# Patient Record
Sex: FEMALE | HISPANIC OR LATINO | Employment: UNEMPLOYED | URBAN - NONMETROPOLITAN AREA
[De-identification: names, ages, dates, MRNs, and addresses within clinical notes are randomized per-mention and may not be internally consistent; named-entity substitution may affect disease eponyms.]

---
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
## 2024-01-15 ENCOUNTER — OFFICE VISIT (OUTPATIENT)
Dept: OBSTETRICS AND GYNECOLOGY | Facility: CLINIC | Age: 19
End: 2024-01-15
Payer: COMMERCIAL

## 2024-01-15 VITALS
BODY MASS INDEX: 23.74 KG/M2 | HEIGHT: 62 IN | SYSTOLIC BLOOD PRESSURE: 112 MMHG | WEIGHT: 129 LBS | DIASTOLIC BLOOD PRESSURE: 62 MMHG

## 2024-01-15 DIAGNOSIS — Z30.016 ENCOUNTER FOR INITIAL PRESCRIPTION OF TRANSDERMAL PATCH HORMONAL CONTRACEPTIVE DEVICE: Primary | ICD-10-CM

## 2024-01-15 PROCEDURE — 1159F MED LIST DOCD IN RCRD: CPT | Performed by: PHYSICIAN ASSISTANT

## 2024-01-15 PROCEDURE — 1160F RVW MEDS BY RX/DR IN RCRD: CPT | Performed by: PHYSICIAN ASSISTANT

## 2024-01-15 PROCEDURE — 99202 OFFICE O/P NEW SF 15 MIN: CPT | Performed by: PHYSICIAN ASSISTANT

## 2024-01-15 RX ORDER — NORELGESTROMIN AND ETHINYL ESTRADIOL 150; 35 UG/D; UG/D
1 PATCH TRANSDERMAL WEEKLY
Qty: 3 PATCH | Refills: 12 | Status: SHIPPED | OUTPATIENT
Start: 2024-01-15 | End: 2025-01-14

## 2024-01-15 NOTE — PATIENT INSTRUCTIONS
Start patch first day of next menses  Patch not effective first month  Condoms always  RTO 4 months for annual exam/pap

## 2024-01-15 NOTE — PROGRESS NOTES
"Subjective   Chief Complaint   Patient presents with    Contraception     IUD Consult       Aileen Still is a 18 y.o. year old No obstetric history on file. presenting to be seen for contraception.  She is considering contraceptive patch or possibly IUD  Has taken ocp about 1 year ago and could not be consistent remembering ocp daily  Has regular monthly cycles and LMP Dec 18-22  Had EAB summer 2023  She declines STI screening  After discussing all contraceptive options she would like to try contraceptive patch    Past Medical History:   Diagnosis Date    Asthma     Chlamydia         Current Outpatient Medications:     norelgestromin-ethinyl estradiol (Xulane) 150-35 MCG/24HR, Place 1 patch on the skin as directed by provider 1 (One) Time Per Week., Disp: 3 patch, Rfl: 12   No Known Allergies   History reviewed. No pertinent surgical history.   Social History     Socioeconomic History    Marital status: Single   Tobacco Use    Smoking status: Never    Smokeless tobacco: Never   Vaping Use    Vaping Use: Never used   Substance and Sexual Activity    Alcohol use: Never    Drug use: Never    Sexual activity: Yes     Partners: Male     Birth control/protection: Abstinence      History reviewed. No pertinent family history.    Review of Systems   Constitutional:  Negative for chills, diaphoresis and fever.   Gastrointestinal:  Negative for constipation, diarrhea, nausea and vomiting.   Genitourinary:  Negative for difficulty urinating, dysuria, menstrual problem, pelvic pain and vaginal discharge.           Objective   /62   Ht 157.5 cm (62\")   Wt 58.5 kg (129 lb)   BMI 23.59 kg/m²     Physical Exam  Constitutional:       Appearance: Normal appearance. She is well-developed and well-groomed.   Eyes:      General: Lids are normal.      Extraocular Movements: Extraocular movements intact.      Conjunctiva/sclera: Conjunctivae normal.   Skin:     General: Skin is warm and dry.      Findings: No bruising or " lesion.   Neurological:      General: No focal deficit present.      Mental Status: She is alert and oriented to person, place, and time.   Psychiatric:         Attention and Perception: Attention normal.         Mood and Affect: Mood normal.         Speech: Speech normal.         Behavior: Behavior is cooperative.            Result Review :                   Assessment and Plan  Diagnoses and all orders for this visit:    1. Encounter for initial prescription of transdermal patch hormonal contraceptive device (Primary)    Other orders  -     norelgestromin-ethinyl estradiol (Xulane) 150-35 MCG/24HR; Place 1 patch on the skin as directed by provider 1 (One) Time Per Week.  Dispense: 3 patch; Refill: 12      Patient Instructions   Start patch first day of next menses  Patch not effective first month  Condoms always  RTO 4 months for annual exam/pap            This note was electronically signed.    Joselin Alfonso PA-C   January 15, 2024

## 2024-08-09 PROBLEM — J02.9 SORE THROAT: Status: ACTIVE | Noted: 2024-08-09

## 2024-08-11 ENCOUNTER — APPOINTMENT (OUTPATIENT)
Dept: CT IMAGING | Facility: HOSPITAL | Age: 19
End: 2024-08-11
Payer: COMMERCIAL

## 2024-08-11 ENCOUNTER — HOSPITAL ENCOUNTER (EMERGENCY)
Facility: HOSPITAL | Age: 19
Discharge: HOME | End: 2024-08-11
Attending: STUDENT IN AN ORGANIZED HEALTH CARE EDUCATION/TRAINING PROGRAM
Payer: COMMERCIAL

## 2024-08-11 VITALS
TEMPERATURE: 97.9 F | HEIGHT: 64 IN | BODY MASS INDEX: 22.02 KG/M2 | WEIGHT: 129 LBS | DIASTOLIC BLOOD PRESSURE: 65 MMHG | HEART RATE: 75 BPM | SYSTOLIC BLOOD PRESSURE: 122 MMHG | OXYGEN SATURATION: 97 % | RESPIRATION RATE: 18 BRPM

## 2024-08-11 DIAGNOSIS — J40 BRONCHITIS: Primary | ICD-10-CM

## 2024-08-11 LAB
ALBUMIN SERPL-MCNC: 4.1 G/DL (ref 3.5–5.2)
ALBUMIN/GLOB SERPL: 1.2 G/DL
ALP SERPL-CCNC: 60 U/L (ref 39–117)
ALT SERPL W P-5'-P-CCNC: 15 U/L (ref 1–33)
ANION GAP SERPL CALCULATED.3IONS-SCNC: 10.7 MMOL/L (ref 5–15)
AST SERPL-CCNC: 21 U/L (ref 1–32)
BASOPHILS # BLD AUTO: 0.03 10*3/MM3 (ref 0–0.2)
BASOPHILS NFR BLD AUTO: 0.5 % (ref 0–1.5)
BILIRUB SERPL-MCNC: 0.2 MG/DL (ref 0–1.2)
BUN SERPL-MCNC: 7 MG/DL (ref 6–20)
BUN/CREAT SERPL: 11.9 (ref 7–25)
CALCIUM SPEC-SCNC: 8.6 MG/DL (ref 8.6–10.5)
CHLORIDE SERPL-SCNC: 105 MMOL/L (ref 98–107)
CO2 SERPL-SCNC: 21.3 MMOL/L (ref 22–29)
CREAT SERPL-MCNC: 0.59 MG/DL (ref 0.57–1)
DEPRECATED RDW RBC AUTO: 45.1 FL (ref 37–54)
EGFRCR SERPLBLD CKD-EPI 2021: 131.7 ML/MIN/1.73
EOSINOPHIL # BLD AUTO: 0.12 10*3/MM3 (ref 0–0.4)
EOSINOPHIL NFR BLD AUTO: 2 % (ref 0.3–6.2)
ERYTHROCYTE [DISTWIDTH] IN BLOOD BY AUTOMATED COUNT: 15.6 % (ref 12.3–15.4)
GLOBULIN UR ELPH-MCNC: 3.4 GM/DL
GLUCOSE SERPL-MCNC: 101 MG/DL (ref 65–99)
HCT VFR BLD AUTO: 39.8 % (ref 34–46.6)
HGB BLD-MCNC: 13 G/DL (ref 12–15.9)
IMM GRANULOCYTES # BLD AUTO: 0.01 10*3/MM3 (ref 0–0.05)
IMM GRANULOCYTES NFR BLD AUTO: 0.2 % (ref 0–0.5)
LYMPHOCYTES # BLD AUTO: 2.62 10*3/MM3 (ref 0.7–3.1)
LYMPHOCYTES NFR BLD AUTO: 44.3 % (ref 19.6–45.3)
MCH RBC QN AUTO: 26.1 PG (ref 26.6–33)
MCHC RBC AUTO-ENTMCNC: 32.7 G/DL (ref 31.5–35.7)
MCV RBC AUTO: 79.9 FL (ref 79–97)
MONOCYTES # BLD AUTO: 0.35 10*3/MM3 (ref 0.1–0.9)
MONOCYTES NFR BLD AUTO: 5.9 % (ref 5–12)
NEUTROPHILS NFR BLD AUTO: 2.78 10*3/MM3 (ref 1.7–7)
NEUTROPHILS NFR BLD AUTO: 47.1 % (ref 42.7–76)
NRBC BLD AUTO-RTO: 0 /100 WBC (ref 0–0.2)
NT-PROBNP SERPL-MCNC: <36 PG/ML (ref 0–450)
PLATELET # BLD AUTO: 316 10*3/MM3 (ref 140–450)
PMV BLD AUTO: 9.6 FL (ref 6–12)
POTASSIUM SERPL-SCNC: 4.1 MMOL/L (ref 3.5–5.2)
PROT SERPL-MCNC: 7.5 G/DL (ref 6–8.5)
RBC # BLD AUTO: 4.98 10*6/MM3 (ref 3.77–5.28)
SODIUM SERPL-SCNC: 137 MMOL/L (ref 136–145)
TROPONIN T SERPL HS-MCNC: <6 NG/L
WBC NRBC COR # BLD AUTO: 5.91 10*3/MM3 (ref 3.4–10.8)

## 2024-08-11 PROCEDURE — 93005 ELECTROCARDIOGRAM TRACING: CPT | Performed by: STUDENT IN AN ORGANIZED HEALTH CARE EDUCATION/TRAINING PROGRAM

## 2024-08-11 PROCEDURE — 84484 ASSAY OF TROPONIN QUANT: CPT | Performed by: STUDENT IN AN ORGANIZED HEALTH CARE EDUCATION/TRAINING PROGRAM

## 2024-08-11 PROCEDURE — 25510000001 IOPAMIDOL 61 % SOLUTION: Performed by: STUDENT IN AN ORGANIZED HEALTH CARE EDUCATION/TRAINING PROGRAM

## 2024-08-11 PROCEDURE — 71275 CT ANGIOGRAPHY CHEST: CPT

## 2024-08-11 PROCEDURE — 83880 ASSAY OF NATRIURETIC PEPTIDE: CPT | Performed by: STUDENT IN AN ORGANIZED HEALTH CARE EDUCATION/TRAINING PROGRAM

## 2024-08-11 PROCEDURE — 85025 COMPLETE CBC W/AUTO DIFF WBC: CPT | Performed by: STUDENT IN AN ORGANIZED HEALTH CARE EDUCATION/TRAINING PROGRAM

## 2024-08-11 PROCEDURE — 99285 EMERGENCY DEPT VISIT HI MDM: CPT

## 2024-08-11 PROCEDURE — 80053 COMPREHEN METABOLIC PANEL: CPT | Performed by: STUDENT IN AN ORGANIZED HEALTH CARE EDUCATION/TRAINING PROGRAM

## 2024-08-11 RX ORDER — ALBUTEROL SULFATE 90 UG/1
2 AEROSOL, METERED RESPIRATORY (INHALATION) EVERY 4 HOURS PRN
Qty: 8 G | Refills: 0 | Status: SHIPPED | OUTPATIENT
Start: 2024-08-11

## 2024-08-11 RX ADMIN — IOPAMIDOL 70 ML: 612 INJECTION, SOLUTION INTRAVENOUS at 14:18

## 2024-08-11 NOTE — ED PROVIDER NOTES
Subjective:  History of Present Illness:    Patient is a 19-year-old female history of asthma recently diagnosed with COVID-19 who presents today with mopped assist.  Reports 2 days of hemoptysis.  States that she was diagnosed with COVID-19 4 days prior.  Denies significant chest pain or shortness of breath but has had cough during this episode.  Says that she had continued episodes of mopped assist today, was concern for possible PE and now presents to our emergency department for evaluation.  No hypoxia on arrival.  Denies any abdominal pain nausea vomiting or diarrhea.  No unilateral leg swelling or leg pain.  No personal history of PE/DVT.      Nurses Notes reviewed and agree, including vitals, allergies, social history and prior medical history.     REVIEW OF SYSTEMS: All systems reviewed and not pertinent unless noted.  Review of Systems   Constitutional:  Positive for activity change, chills and fatigue. Negative for appetite change and fever.   HENT:  Negative for rhinorrhea, sinus pressure and sinus pain.    Eyes:  Negative for discharge and itching.   Respiratory:  Positive for cough. Negative for shortness of breath.    Cardiovascular:  Negative for chest pain and leg swelling.   Gastrointestinal:  Negative for abdominal distention, abdominal pain, nausea and vomiting.   Endocrine: Negative for cold intolerance and heat intolerance.   Genitourinary:  Negative for decreased urine volume, difficulty urinating, flank pain, frequency, urgency, vaginal bleeding, vaginal discharge and vaginal pain.   Musculoskeletal:  Negative for gait problem, neck pain and neck stiffness.   Skin:  Negative for color change.   Allergic/Immunologic: Negative for environmental allergies.   Neurological:  Negative for seizures, syncope, facial asymmetry and speech difficulty.   Psychiatric/Behavioral:  Negative for self-injury and suicidal ideas.        Past Medical History:   Diagnosis Date    Asthma     Chlamydia   "      Allergies:    Patient has no known allergies.      History reviewed. No pertinent surgical history.      Social History     Socioeconomic History    Marital status: Single   Tobacco Use    Smoking status: Never    Smokeless tobacco: Never   Vaping Use    Vaping status: Never Used   Substance and Sexual Activity    Alcohol use: Never    Drug use: Never    Sexual activity: Yes     Partners: Male     Birth control/protection: Abstinence         History reviewed. No pertinent family history.    Objective  Physical Exam:  /65   Pulse 75   Temp 97.9 °F (36.6 °C) (Oral)   Resp 18   Ht 162.6 cm (64\")   Wt 58.5 kg (129 lb)   LMP 07/15/2024   SpO2 97%   BMI 22.14 kg/m²      Physical Exam  Constitutional:       General: She is not in acute distress.     Appearance: Normal appearance. She is not ill-appearing.   HENT:      Head: Normocephalic and atraumatic.      Nose: Nose normal. No congestion or rhinorrhea.      Mouth/Throat:      Mouth: Mucous membranes are dry.      Pharynx: Oropharynx is clear. No oropharyngeal exudate or posterior oropharyngeal erythema.   Eyes:      Extraocular Movements: Extraocular movements intact.      Conjunctiva/sclera: Conjunctivae normal.      Pupils: Pupils are equal, round, and reactive to light.   Cardiovascular:      Rate and Rhythm: Normal rate and regular rhythm.      Pulses: Normal pulses.      Heart sounds: Normal heart sounds.   Pulmonary:      Effort: Pulmonary effort is normal. No respiratory distress.      Breath sounds: Normal breath sounds.   Abdominal:      General: Abdomen is flat. Bowel sounds are normal. There is no distension.      Palpations: Abdomen is soft.      Tenderness: There is no abdominal tenderness. There is no guarding or rebound.   Musculoskeletal:         General: No swelling or tenderness. Normal range of motion.      Cervical back: Normal range of motion and neck supple.   Skin:     General: Skin is warm and dry.      Capillary Refill: " Capillary refill takes less than 2 seconds.   Neurological:      General: No focal deficit present.      Mental Status: She is alert and oriented to person, place, and time. Mental status is at baseline.      Cranial Nerves: No cranial nerve deficit.      Sensory: No sensory deficit.      Motor: No weakness.      Coordination: Coordination normal.   Psychiatric:         Mood and Affect: Mood normal.         Behavior: Behavior normal.         Thought Content: Thought content normal.         Judgment: Judgment normal.         Procedures    ED Course:         Lab Results (last 24 hours)       Procedure Component Value Units Date/Time    CBC & Differential [[id number]]  (Abnormal) Collected: 08/11/24 1313    Specimen: Blood Updated: 08/11/24 1322    Narrative:      The following orders were created for panel order CBC & Differential.  Procedure                               Abnormality         Status                     ---------                               -----------         ------                     CBC Auto Differential[[id number]]        Abnormal            Final result                 Please view results for these tests on the individual orders.    Comprehensive Metabolic Panel [[id number]]  (Abnormal) Collected: 08/11/24 1313    Specimen: Blood Updated: 08/11/24 1340     Glucose 101 mg/dL      BUN 7 mg/dL      Creatinine 0.59 mg/dL      Sodium 137 mmol/L      Potassium 4.1 mmol/L      Chloride 105 mmol/L      CO2 21.3 mmol/L      Calcium 8.6 mg/dL      Total Protein 7.5 g/dL      Albumin 4.1 g/dL      ALT (SGPT) 15 U/L      AST (SGOT) 21 U/L      Alkaline Phosphatase 60 U/L      Total Bilirubin 0.2 mg/dL      Globulin 3.4 gm/dL      A/G Ratio 1.2 g/dL      BUN/Creatinine Ratio 11.9     Anion Gap 10.7 mmol/L      eGFR 131.7 mL/min/1.73     Narrative:      GFR Normal >60  Chronic Kidney Disease <60  Kidney Failure <15      High Sensitivity Troponin T [[id number]]  (Normal) Collected: 08/11/24 1313    Specimen: Blood  Updated: 08/11/24 1343     HS Troponin T <6 ng/L     Narrative:      High Sensitive Troponin T Reference Range:  <14.0 ng/L- Negative Female for AMI  <22.0 ng/L- Negative Male for AMI  >=14 - Abnormal Female indicating possible myocardial injury.  >=22 - Abnormal Male indicating possible myocardial injury.   Clinicians would have to utilize clinical acumen, EKG, Troponin, and serial changes to determine if it is an Acute Myocardial Infarction or myocardial injury due to an underlying chronic condition.         BNP [[id number]]  (Normal) Collected: 08/11/24 1313    Specimen: Blood Updated: 08/11/24 1347     proBNP <36.0 pg/mL     Narrative:      This assay is used as an aid in the diagnosis of individuals suspected of having heart failure. It can be used as an aid in the diagnosis of acute decompensated heart failure (ADHF) in patients presenting with signs and symptoms of ADHF to the emergency department (ED). In addition, NT-proBNP of <300 pg/mL indicates ADHF is not likely.    Age Range Result Interpretation  NT-proBNP Concentration (pg/mL:      <50             Positive            >450                   Gray                 300-450                    Negative             <300    50-75           Positive            >900                  Gray                300-900                  Negative            <300      >75             Positive            >1800                  Gray                300-1800                  Negative            <300    CBC Auto Differential [[id number]]  (Abnormal) Collected: 08/11/24 1313    Specimen: Blood Updated: 08/11/24 1322     WBC 5.91 10*3/mm3      RBC 4.98 10*6/mm3      Hemoglobin 13.0 g/dL      Hematocrit 39.8 %      MCV 79.9 fL      MCH 26.1 pg      MCHC 32.7 g/dL      RDW 15.6 %      RDW-SD 45.1 fl      MPV 9.6 fL      Platelets 316 10*3/mm3      Neutrophil % 47.1 %      Lymphocyte % 44.3 %      Monocyte % 5.9 %      Eosinophil % 2.0 %      Basophil % 0.5 %      Immature Grans % 0.2  %      Neutrophils, Absolute 2.78 10*3/mm3      Lymphocytes, Absolute 2.62 10*3/mm3      Monocytes, Absolute 0.35 10*3/mm3      Eosinophils, Absolute 0.12 10*3/mm3      Basophils, Absolute 0.03 10*3/mm3      Immature Grans, Absolute 0.01 10*3/mm3      nRBC 0.0 /100 WBC              CT Angiogram Chest Pulmonary Embolism    Result Date: 8/11/2024  PROCEDURE: CT ANGIOGRAM CHEST PULMONARY EMBOLISM-  HISTORY: Hemoptysis, COVID-positive, eval PE   COMPARISON: None  FINDINGS: Thin section axial CT images of the chest were obtained with contrast. 3D reformatted images were also obtained. This study was performed with techniques to keep radiation doses as low as reasonably achievable, (ALARA). Individualized dose reduction techniques using automated exposure control or adjustment of mA and/or kV according to the patient size were employed.  There is no evidence of pulmonary embolism. There is no evidence of thoracic aortic aneurysm or dissection. There is no evidence of mediastinal or hilar mass or adenopathy.  There is no evidence of pulmonary mass or suspicious nodule. No localized inflammatory process is seen within the lungs. There is no evidence of pleural effusion or pneumothorax. No focal chest wall abnormality is identified.  Limited images of the upper abdomen  are unremarkable.       Impression:  No evidence of pulmonary embolism.  No mass or localized inflammatory process.     CTDI: 2.39 mGy DLP:93.65 mGy.cm   This report was signed and finalized on 8/11/2024 2:23 PM by Jamie Forrest MD.          MDM      Initial impression of presenting illness: Hemoptysis    DDX: includes but is not limited to: PE, bacterial pneumonia, bronchitis, diffuse alveolar hemorrhage    Patient arrives stable with vitals interpreted by myself.     Pertinent features from physical exam: Clear to auscultation, regular rate and rhythm, no murmur, nontender to abdominal palpation, no pedal edema bilateral, negative Homans.    Initial  diagnostic plan: CBC, CMP, troponin, EKG, CT PE    Results from initial plan were reviewed and interpreted by me revealing no concerns for blood clot, pneumonia or other acute process    Diagnostic information from other sources: Discussed with patient's friend at bedside reviewed past medical records    Interventions / Re-evaluation: Emergency department for over an hour and a half with no change in vital signs, no worsening of respiratory status    Results/clinical rationale were discussed with patient at bedside    Consultations/Discussion of results with other physicians: Discussed no concern for PE on workup today.  Given patient symptoms suspect bronchitis is etiology of patient's hemoptysis.  Have sent prescription for albuterol inhaler and encourage patient to follow with her primary care doctor to ensure that the symptoms improve appropriately.  Strict return precaution for severe increase in chest pain, shortness of breath.    Disposition plan: Discharge  -----        Final diagnoses:   Bronchitis          Gregory Moore MD  08/11/24 0755

## 2024-08-11 NOTE — Clinical Note
[facility] EMERGENCY DEPARTMENT  [address] [address]  Phone: [phone]    Aileen Still was seen and treated in our emergency department on 8/11/2024.  She may return to work on 08/14/2024.         Thank you for choosing [facility].    Gregory Moore MD

## 2024-08-11 NOTE — DISCHARGE INSTRUCTIONS
You were evaluated for cough congestion and coughing up blood.  We got labs and a CT scan of your chest that showed no concern for a blood clot.  Given this, we think that you likely have a bronchitis and that irritation is caused the blood that you are experiencing.  You are now stable for discharge.  We have sent albuterol inhaler to your pharmacy if you begin to have increasing shortness of breath would recommend using this.  Otherwise, we will follow with primary care doctor to ensure the improve appropriately.  If you end to have severe chest pain shortness of breath please [facility] emergency department for further evaluation.  You are occiput discharge.

## 2025-01-30 ENCOUNTER — OFFICE VISIT (OUTPATIENT)
Age: 20
End: 2025-01-30
Payer: COMMERCIAL

## 2025-01-30 VITALS
HEIGHT: 64 IN | SYSTOLIC BLOOD PRESSURE: 128 MMHG | BODY MASS INDEX: 21.85 KG/M2 | HEART RATE: 55 BPM | DIASTOLIC BLOOD PRESSURE: 70 MMHG | WEIGHT: 128 LBS | OXYGEN SATURATION: 98 % | RESPIRATION RATE: 18 BRPM

## 2025-01-30 DIAGNOSIS — F41.8 ANXIETY WITH DEPRESSION: ICD-10-CM

## 2025-01-30 DIAGNOSIS — Z23 NEED FOR HPV VACCINATION: ICD-10-CM

## 2025-01-30 DIAGNOSIS — R41.840 ATTENTION DEFICIT: Primary | ICD-10-CM

## 2025-01-30 DIAGNOSIS — Z01.419 PAP TEST, AS PART OF ROUTINE GYNECOLOGICAL EXAMINATION: ICD-10-CM

## 2025-01-30 DIAGNOSIS — Z23 NEED FOR COVID-19 VACCINE: ICD-10-CM

## 2025-01-30 PROCEDURE — 90471 IMMUNIZATION ADMIN: CPT | Performed by: NURSE PRACTITIONER

## 2025-01-30 PROCEDURE — 91320 SARSCV2 VAC 30MCG TRS-SUC IM: CPT | Performed by: NURSE PRACTITIONER

## 2025-01-30 PROCEDURE — 99213 OFFICE O/P EST LOW 20 MIN: CPT | Performed by: NURSE PRACTITIONER

## 2025-01-30 PROCEDURE — 90480 ADMN SARSCOV2 VAC 1/ONLY CMP: CPT | Performed by: NURSE PRACTITIONER

## 2025-01-30 PROCEDURE — 90651 9VHPV VACCINE 2/3 DOSE IM: CPT | Performed by: NURSE PRACTITIONER

## 2025-01-30 NOTE — PROGRESS NOTES
Chief Complaint   Patient presents with    attention span     Not able to focus     Depression       Subjective   Aileen Still is a 20 y.o. female    DepressionSymptoms include decreased concentration and nervousness/anxiety. Patient is not experiencing: palpitations, shortness of breath, chest pain, dizziness and nausea.  Her past medical history is significant for depression.     Patient today to establish care.  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    No Known Allergies  Past Medical History:   Diagnosis Date    Asthma     Chlamydia       History reviewed. No pertinent surgical history.  Social History     Socioeconomic History    Marital status: Single   Tobacco Use    Smoking status: Never    Smokeless tobacco: Never   Vaping Use    Vaping status: Never Used   Substance and Sexual Activity    Alcohol use: Never    Drug use: Never    Sexual activity: Yes     Partners: Male      No current outpatient medications on file.     Review of Systems   Constitutional:  Negative for chills, fatigue, fever and unexpected weight change.   Respiratory:  Negative for cough, chest tightness, shortness of breath and wheezing.    Cardiovascular:  Negative for chest pain, palpitations and leg swelling.   Gastrointestinal:  Negative for constipation, diarrhea, nausea and vomiting.   Genitourinary:  Negative for difficulty urinating, dysuria and menstrual problem (LMP- 1/20/2025, No PAP).   Skin:  Negative for color change and rash.   Neurological:  Negative for dizziness, syncope, weakness and headaches.   Psychiatric/Behavioral:  Positive for decreased concentration and dysphoric mood. Negative for sleep disturbance. The patient is nervous/anxious.        Objective     Vitals:    01/30/25 1029   BP: 128/70   Pulse: 55   Resp: 18   SpO2: 98%         01/30/25  1029   Weight: 58.1 kg (128 lb)     Body mass index is 22.12 kg/m².  Results for  orders placed or performed in visit on 10/04/24   QuantiFERON-TB Gold Plus    Collection Time: 10/04/24  3:29 PM    Specimen: Blood   Result Value Ref Range    QuantiFERON Incubation Incubation performed.     QUANTIFERON-TB GOLD PLUS Negative Negative   QuantiFERON-TB Gold Plus    Collection Time: 10/04/24  3:29 PM    Specimen: Blood   Result Value Ref Range    QuantiFERON Criteria Comment     QUANTIFERON TB1 AG VALUE 0.19 IU/mL    QUANTIFERON TB2 AG VALUE 0.12 IU/mL    QuantiFERON Nil Value 0.22 IU/mL    QuantiFERON Mitogen Value >10.00 IU/mL       Physical Exam  Vitals reviewed.   Constitutional:       Appearance: She is well-developed.   HENT:      Head: Normocephalic and atraumatic.   Cardiovascular:      Rate and Rhythm: Normal rate and regular rhythm.      Heart sounds: Normal heart sounds.   Pulmonary:      Effort: Pulmonary effort is normal.      Breath sounds: Normal breath sounds.   Skin:     General: Skin is warm and dry.   Neurological:      Mental Status: She is alert and oriented to person, place, and time.   Psychiatric:         Behavior: Behavior normal.       Assessment & Plan   Problems Addressed this Visit    None  Visit Diagnoses       Attention deficit    -  Primary    Relevant Orders    Ambulatory Referral to Psychiatry    Anxiety with depression        Relevant Orders    Ambulatory Referral to Psychology    Need for COVID-19 vaccine        Relevant Orders    COVID-19 (Pfizer) 12yrs+ (COMIRNATY)    Need for HPV vaccination        Relevant Orders    HPV Vaccine (HPV9)    Pap test, as part of routine gynecological examination        Relevant Orders    Ambulatory Referral to Obstetrics / Gynecology          Diagnoses         Codes Comments    Attention deficit    -  Primary ICD-10-CM: R41.840  ICD-9-CM: 799.51     Anxiety with depression     ICD-10-CM: F41.8  ICD-9-CM: 300.4     Need for COVID-19 vaccine     ICD-10-CM: Z23  ICD-9-CM: V04.89     Need for HPV vaccination     ICD-10-CM:  Z23  ICD-9-CM: V04.89     Pap test, as part of routine gynecological examination     ICD-10-CM: Z01.419  ICD-9-CM: V76.2         I will refer her both to psychiatry for evaluation for ADHD and possible medications as well as psychology for counseling. Patient was encouraged to keep me informed of any acute changes, lack of improvement, or any new concerning symptoms.  If patient becomes concerned, or has any worsening symptoms, they are to report either here, urgent treatment, or emergency room. Plan of care discussed with pt. They verbalized understanding and agreement.     She will go ahead and update her vaccinations with the COVID vaccination and start the HPV vaccine series today.    She has questions about birth control options as well as has never had Pap testing.  I will refer her to OB/GYN for this.    Return visit in 1 month for physical and second HPV vaccine.    Counseling provided on anxiety.    Eagle Jenkins Genny, APRN   1/30/2025   11:00 EST     Please note that portions of this document were completed with a voice recognition program.     At [facility], we believe that sharing information builds trust and better relationships. You are receiving this note because you are receiving care at [facility] or have recently visited. It is possible you will see health information before a provider has talked with you about it. This kind of information can be easy to misunderstand as it is a medical document. It is intended as [id number] communication. It is written in medical language and may contain abbreviations or verbiage that are unfamiliar. It may appear blunt or direct. Medical documents are intended to carry relevant information, facts as evident, and the clinical opinion of the practitioner.  To help you fully understand what it means for your health, we urge you to discuss this note with your provider.

## 2025-01-30 NOTE — LETTER
[facility]  Vaccine Consent Form    Patient Name:  Aileen Still  Patient :  2005     Vaccine(s) Ordered    HPV Vaccine (HPV9)  COVID-19 (Pfizer) 12yrs+ (COMIRNATY)        Screening Checklist  The following questions should be completed prior to vaccination. If you answer “yes” to any question, it does not necessarily mean you should not be vaccinated. It just means we may need to clarify or ask more questions. If a question is unclear, please ask your healthcare provider to explain it.    Yes No   Any fever or moderate to severe illness today (mild illness and/or antibiotic treatment are not contraindications)?     Do you have a history of a serious reaction to any previous vaccinations, such as anaphylaxis, encephalopathy within 7 days, Guillain-Oviedo syndrome within 6 weeks, seizure?     Have you received any live vaccine(s) (e.g MMR, BRYANNA) or any other vaccines in the last month (to ensure duplicate doses aren't given)?     Do you have an anaphylactic allergy to latex (DTaP, DTaP-IPV, Hep A, Hep B, MenB, RV, Td, Tdap), baker’s yeast (Hep B, HPV), polysorbates (RSV, nirsevimab, PCV 20, Rotavirrus, Tdap, Shingrix), or gelatin (BRYANNA, MMR)?     Do you have an anaphylactic allergy to neomycin (Rabies, BRYANNA, MMR, IPV, Hep A), polymyxin B (IPV), or streptomycin (IPV)?      Any cancer, leukemia, AIDS, or other immune system disorder? (BRYANNA, MMR, RV)     Do you have a parent, brother, or sister with an immune system problem (if immune competence of vaccine recipient clinically verified, can proceed)? (MMR, BRYANNA)     Any recent steroid treatments for >2 weeks, chemotherapy, or radiation treatment? (BRYANNA, MMR)     Have you received antibody-containing blood transfusions or IVIG in the past 11 months (recommended interval is dependent on product)? (MMR, BRYANNA)     Have you taken antiviral drugs (acyclovir, famciclovir, valacyclovir for BRYANNA) in the last 24 or 48 hours, respectively?      Are you pregnant or planning  "to become pregnant within 1 month? (BRYANNA, MMR, HPV, IPV, MenB, Abrexvy; For Hep B- refer to Engerix-B; For RSV - Abrysvo is indicated for 32-36 weeks of pregnancy from September to January)     For infants, have you ever been told your child has had intussusception or a medical emergency involving obstruction of the intestine (Rotavirus)? If not for an infant, can skip this question.         *Ordering Physicians/APC should be consulted if \"yes\" is checked by the patient or guardian above.  I have received, read, and understand the Vaccine Information Statement (VIS) for each vaccine ordered.  I have considered my or my child's health status as well as the health status of my close contacts.  I have taken the opportunity to discuss my vaccine questions with my or my child's health care provider.   I have requested that the ordered vaccine(s) be given to me or my child.  I understand the benefits and risks of the vaccines.  I understand that I should remain in the clinic for 15 minutes after receiving the vaccine(s).  _________________________________________________________  Signature of Patient or Parent/Legal Guardian ____________________  Date     "

## 2025-01-31 ENCOUNTER — PATIENT ROUNDING (BHMG ONLY) (OUTPATIENT)
Age: 20
End: 2025-01-31
Payer: COMMERCIAL

## 2025-03-04 ENCOUNTER — OFFICE VISIT (OUTPATIENT)
Age: 20
End: 2025-03-04
Payer: COMMERCIAL

## 2025-03-04 VITALS
OXYGEN SATURATION: 98 % | BODY MASS INDEX: 21.92 KG/M2 | HEART RATE: 75 BPM | DIASTOLIC BLOOD PRESSURE: 72 MMHG | SYSTOLIC BLOOD PRESSURE: 104 MMHG | WEIGHT: 128.4 LBS | HEIGHT: 64 IN

## 2025-03-04 DIAGNOSIS — Z51.81 ENCOUNTER FOR THERAPEUTIC DRUG MONITORING: ICD-10-CM

## 2025-03-04 DIAGNOSIS — F41.8 PERFORMANCE ANXIETY: Primary | ICD-10-CM

## 2025-03-04 DIAGNOSIS — Z86.79 HISTORY OF SINUS BRADYCARDIA: ICD-10-CM

## 2025-03-04 DIAGNOSIS — F43.12 CHRONIC POST-TRAUMATIC STRESS DISORDER (PTSD): ICD-10-CM

## 2025-03-04 DIAGNOSIS — F40.228: ICD-10-CM
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
## 2025-03-07 LAB
1OH-MIDAZOLAM UR QL SCN: NOT DETECTED NG/MG CREAT
6MAM UR QL SCN: NEGATIVE NG/ML
7AMINOCLONAZEPAM/CREAT UR: NOT DETECTED NG/MG CREAT
A-OH ALPRAZ/CREAT UR: NOT DETECTED NG/MG CREAT
A-OH-TRIAZOLAM/CREAT UR CFM: NOT DETECTED NG/MG CREAT
ACP UR QL CFM: NOT DETECTED
ALPRAZ/CREAT UR CFM: NOT DETECTED NG/MG CREAT
AMPHETAMINES UR QL SCN: NEGATIVE NG/ML
APAP UR QL SCN: NEGATIVE UG/ML
BARBITURATES UR QL SCN: NEGATIVE NG/ML
BENZODIAZ SCN METH UR: NEGATIVE
BUPRENORPHINE UR QL SCN: NEGATIVE
BUPRENORPHINE/CREAT UR: NOT DETECTED NG/MG CREAT
CANNABINOIDS UR QL CFM: NORMAL
CANNABINOIDS UR QL SCN: NORMAL NG/ML
CARBOXYTHC UR CFM-MCNC: 15 NG/MG CREAT
CARISOPRODOL UR QL: NEGATIVE NG/ML
CLONAZEPAM/CREAT UR CFM: NOT DETECTED NG/MG CREAT
COCAINE+BZE UR QL SCN: NEGATIVE NG/ML
CREAT UR-MCNC: 196 MG/DL
D-METHORPHAN UR-MCNC: NOT DETECTED NG/ML
D-METHORPHAN+LEVORPHANOL UR QL: NOT DETECTED
DESALKYLFLURAZ/CREAT UR: NOT DETECTED NG/MG CREAT
DIAZEPAM/CREAT UR: NOT DETECTED NG/MG CREAT
ETHANOL UR QL SCN: NEGATIVE G/DL
ETHANOL UR QL SCN: NEGATIVE NG/ML
FENTANYL CTO UR SCN-MCNC: NEGATIVE NG/ML
FENTANYL/CREAT UR: NOT DETECTED NG/MG CREAT
FLUNITRAZEPAM UR QL SCN: NOT DETECTED NG/MG CREAT
GABAPENTIN UR-MCNC: NEGATIVE UG/ML
HALLUCINOGENS UR: NEGATIVE
HCG UR QL: NEGATIVE
HYPNOTICS UR QL SCN: NEGATIVE
KETAMINE UR QL: NOT DETECTED
LORAZEPAM/CREAT UR: NOT DETECTED NG/MG CREAT
MEPERIDINE UR QL SCN: NEGATIVE NG/ML
METHADONE UR QL SCN: NEGATIVE NG/ML
METHADONE+METAB UR QL SCN: NEGATIVE NG/ML
MIDAZOLAM/CREAT UR CFM: NOT DETECTED NG/MG CREAT
MISCELLANEOUS, UR: NEGATIVE
NORBUPRENORPHINE/CREAT UR: NOT DETECTED NG/MG CREAT
NORDIAZEPAM/CREAT UR: NOT DETECTED NG/MG CREAT
NORFENTANYL/CREAT UR: NOT DETECTED NG/MG CREAT
NORFLUNITRAZEPAM UR-MCNC: NOT DETECTED NG/MG CREAT
NORKETAMINE UR-MCNC: NOT DETECTED UG/ML
OPIATES UR SCN-MCNC: NEGATIVE NG/ML
OXAZEPAM/CREAT UR: NOT DETECTED NG/MG CREAT
OXYCODONE CTO UR SCN-MCNC: NEGATIVE NG/ML
PCP UR QL SCN: NEGATIVE NG/ML
PRESCRIBED MEDICATIONS: NORMAL
PROPOXYPH UR QL SCN: NEGATIVE NG/ML
TAPENTADOL CTO UR SCN-MCNC: NEGATIVE NG/ML
TEMAZEPAM/CREAT UR: NOT DETECTED NG/MG CREAT
TRAMADOL UR QL SCN: NEGATIVE NG/ML
ZALEPLON UR-MCNC: NOT DETECTED NG/ML
ZOLPIDEM PHENYL-4-CARB UR QL SCN: NOT DETECTED
ZOLPIDEM UR QL SCN: NOT DETECTED
ZOPICLONE-N-OXIDE UR-MCNC: NOT DETECTED NG/ML

## 2025-03-17 ENCOUNTER — HOSPITAL ENCOUNTER (OUTPATIENT)
Dept: CARDIOLOGY | Facility: HOSPITAL | Age: 20
Discharge: HOME | End: 2025-03-17
Admitting: PSYCHIATRY & NEUROLOGY
Payer: COMMERCIAL

## 2025-03-17 ENCOUNTER — TELEPHONE (OUTPATIENT)
Age: 20
End: 2025-03-17
Payer: COMMERCIAL

## 2025-03-17 DIAGNOSIS — Z86.79 HISTORY OF SINUS BRADYCARDIA: ICD-10-CM

## 2025-03-17 PROCEDURE — 93005 ELECTROCARDIOGRAM TRACING: CPT | Performed by: PSYCHIATRY & NEUROLOGY

## 2025-03-17 NOTE — TELEPHONE ENCOUNTER
Patient called advising Dr. Finney requested patient get EKG. Patient attempted stating no order placed.    Order showing in chart ECG 12 Lead [ECG1] (Order [id number]) from 03/04/25.    [facility] leadership to contact patient and assist.

## 2025-03-18 ENCOUNTER — OFFICE VISIT (OUTPATIENT)
Age: 20
End: 2025-03-18
Payer: COMMERCIAL

## 2025-03-18 VITALS
OXYGEN SATURATION: 98 % | DIASTOLIC BLOOD PRESSURE: 72 MMHG | WEIGHT: 128.9 LBS | HEIGHT: 64 IN | BODY MASS INDEX: 22.01 KG/M2 | HEART RATE: 67 BPM | SYSTOLIC BLOOD PRESSURE: 114 MMHG

## 2025-03-18 DIAGNOSIS — F41.8 PERFORMANCE ANXIETY: ICD-10-CM

## 2025-03-18 DIAGNOSIS — F40.228: ICD-10-CM

## 2025-03-18 DIAGNOSIS — F43.12 CHRONIC POST-TRAUMATIC STRESS DISORDER (PTSD): ICD-10-CM

## 2025-03-18 DIAGNOSIS — F51.05 INSOMNIA SECONDARY TO ANXIETY: ICD-10-CM

## 2025-03-18 DIAGNOSIS — F90.0 ADHD (ATTENTION DEFICIT HYPERACTIVITY DISORDER), INATTENTIVE TYPE: Primary | ICD-10-CM

## 2025-03-18 DIAGNOSIS — R00.1 SINUS BRADYCARDIA: ICD-10-CM

## 2025-03-18 DIAGNOSIS — F41.9 INSOMNIA SECONDARY TO ANXIETY: ICD-10-CM

## 2025-03-18 RX ORDER — MIRTAZAPINE 7.5 MG/1
7.5 TABLET, FILM COATED ORAL NIGHTLY
Qty: 30 TABLET | Refills: 0 | Status: SHIPPED | OUTPATIENT
Start: 2025-03-18 | End: 2025-04-17

## 2025-03-18 RX ORDER — METHYLPHENIDATE HYDROCHLORIDE 10 MG/1
10 CAPSULE, EXTENDED RELEASE ORAL DAILY
Qty: 30 CAPSULE | Refills: 0 | Status: SHIPPED | OUTPATIENT
Start: 2025-03-18 | End: 2025-04-17

## 2025-03-18 NOTE — PROGRESS NOTES
[facility]    Follow Up Office Visit      Date: 2025   Patient Name: Aileen Still  : 2005   MRN: [id number]     Referring Provider: Eagle Royal APRN    Chaperone Statement: Rosa Guillen served as a chaperone for this visit and was present for the full visit from 09:03 to 09:27. Patient agreeable to chaperone presence during appointment.     Chief Complaint:      ICD-10-CM ICD-9-CM   1. ADHD (attention deficit hyperactivity disorder), inattentive type  F90.0 314.00   2. Chronic post-traumatic stress disorder (PTSD)  F43.12 309.81   3. Performance anxiety  F41.8 300.09   4. Phobia to darkness or night  F40.228 300.29   5. Sinus bradycardia  R00.1 427.89   6. Insomnia secondary to anxiety  F41.9 300.00    F51.05 327.02        History of Present Illness:   Aileen Still is a 20 y.o. female who is here for follow up with medication management.    History of Present Illness  The patient presents for evaluation of bradycardia, ADHD, anxiety, and sleep disturbance.    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     She reports that her appetite fluctuates with her schedule, often leading to irregular eating patterns. She has lost approximately 7 pounds since 2024, dropping from a weight range of 135 to 128 pounds. Despite this weight loss, she believes her current weight is within a healthy range. She recalls a brief period of anorexia during high school, which lasted for about 3 months. She acknowledges a lack of body positivity but does not perceive it as a significant issue at present. She is not currently taking any multivitamins and is uncertain about her vitamin D levels. She has a scheduled blood work appointment with her primary care physician on 2025.    She underwent a CPT exam two weeks ago, the results of which have not yet been discussed. She  has no prior diagnosis of ADHD and has never been prescribed stimulants. She admits to struggling with college classes but did not experience any significant academic difficulties during high school. She is not currently engaged in therapy.    She continues to struggle with her phobia of darkness, often resorting to sleeping with a light on or using a face mask. Her sleep quality remains poor, although it improves when she works night shifts due to fatigue. She typically gets around 5 hours of sleep and attempts to go to bed by 11 PM, but often finds herself tossing and turning. She works from 6 PM to 6 AM twice or three times a week and switches to day shifts for school. She has considered switching to day shifts permanently but is deterred by the $ 200 pay difference. She takes an over-the-counter sleep aid, Unisom (doxylamine), which she finds helpful but does not like to take it all the time because it makes her feel drowsy upon waking. Melatonin is no longer effective for her. She has never tried trazodone or mirtazapine.    In the past, she would experience an episode of vomiting due to performance anxiety before taking an exam. She has been utilizing rubbing a rock as a coping mechanism for her anxiety, which she reports has been somewhat effective. She also believes that her anxiety levels have decreased following a successful exam performance. However, she anticipates increased anxiety with upcoming exams. She has attempted various mental health medications without success. She used to practice yoga regularly but has not had the time recently.    She had an EKG yesterday, which showed sinus bradycardia. She has never felt any significant symptoms from her bradycardia problem. She has never seen a cardiologist her bradycardia. She thought that the first time her EKG showed bradycardia was because she was sick, but reports that her heart rate was still 49 beats per second for this most recent EKG. She does  "not have any symptoms like fainting or orthostatic hypotension. She does not have any headaches.      Subjective     Screening Scores:   PHQ-9 : 11  DEREJE-7 : 10    Medications:     Current Outpatient Medications:     methylphenidate LA (Ritalin LA) 10 MG 24 hr capsule, Take 1 capsule by mouth Daily for 30 days, Disp: 30 capsule, Rfl: 0    mirtazapine (REMERON) 7.5 MG tablet, Take 1 tablet by mouth Every Night for 30 days., Disp: 30 tablet, Rfl: 0    Medication Considerations:  DAVID reviewed and appropriate.     Allergies:   No Known Allergies    Objective     Vital Signs:   Vitals:    03/18/25 0900   BP: 114/72   Pulse: 67   SpO2: 98%   Weight: 58.5 kg (128 lb 14.4 oz)   Height: 162 cm (63.78\")     Body mass index is 22.28 kg/m².     Mental Status Exam:   MENTAL STATUS EXAM   General Appearance:  Cleanly groomed and dressed  Eye Contact:  Good eye contact  Attitude:  Cooperative  Motor Activity:  Normal gait, posture  Muscle Strength:  Normal  Speech:  Normal rate, tone, volume  Language:  Spontaneous  Mood and affect:  Normal, pleasant  Hopelessness:  3  Loneliness: 3  Thought Process:  Logical, goal-directed and linear  Associations/ Thought Content:  No delusions  Hallucinations:  None  Suicidal Ideations:  Not present  Homicidal Ideation:  Not present  Sensorium:  Alert and clear  Orientation:  Person, place, time and situation  Immediate Recall, Recent, and Remote Memory:  Intact  Attention Span/ Concentration:  Good  Fund of Knowledge:  Appropriate for age and educational level  Intellectual Functioning:  Above average  Insight:  Good  Judgement:  Good  Reliability:  Good  Impulse Control:  Fair        SUICIDE RISK ASSESSMENT/CSSRS:  1. Does patient have thoughts of suicide? She denies  2. Does patient have intent for suicide? She denies  3. Does patient have a current plan for suicide? She denies  4. History of suicide attempts: she denies  5. Family history of suicide or attempts: she denies  6. History of " violent behaviors towards others or property or thoughts of committing suicide: she denies  7. History of sexual aggression toward others: she denies  8. Access to firearms or weapons: she denies    Assessment / Plan      Visit Diagnosis/Orders Placed This Visit:  Diagnoses and all orders for this visit:    1. ADHD (attention deficit hyperactivity disorder), inattentive type (Primary)  -     methylphenidate LA (Ritalin LA) 10 MG 24 hr capsule; Take 1 capsule by mouth Daily for 30 days  Dispense: 30 capsule; Refill: 0    2. Chronic post-traumatic stress disorder (PTSD)    3. Performance anxiety    4. 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 Sinus bradycardia  -     Ambulatory Referral to Cardiology    6. Insomnia secondary to anxiety  -     mirtazapine (REMERON) 7.5 MG tablet; Take 1 tablet by mouth Every Night for 30 days.  Dispense: 30 tablet; Refill: 0         Assessment & Plan  1. Bradycardia.  Her EKG results indicate sinus bradycardia.  She reports no symptoms such as fainting or dizziness. The potential impact of nutritional deficiencies on cardiac function was discussed. She is advised to increase her daily food intake, ensuring three balanced meals per day, and to avoid calorie restriction. The initiation of a multivitamin regimen is recommended. A referral to a cardiologist will be made for further evaluation. She is also advised to take a vitamin D supplement and to ensure her upcoming blood work includes a vitamin D level assessment.    2. Attention deficit hyperactivity disorder (ADHD).  Her CPT exam results suggest a moderate likelihood of ADHD, specifically the inattentive type. She does not exhibit impulsivity or sustained attention issues but may struggle with vigilance. A referral to a psychologist for official ADHD testing will be made. In the interim, Ritalin long-acting will be initiated, to be taken upon waking. She is advised to monitor her appetite closely and to contact the clinic if she experiences  heart palpitations. Mirtazapine will be added to her regimen after one week of Ritalin use, to be taken an hour before sleep.    3. Anxiety.  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 She has developed some coping mechanisms, such as using a rock to manage anxiety. She is encouraged to continue these practices and to explore relaxation exercises. The potential use of propranolol for performance anxiety was discussed but deemed inappropriate due to her bradycardia. She is advised to discuss additional coping strategies with her therapist.    4. Sleep disturbance.  She reports poor sleep quality, exacerbated by her night shift work schedule. She currently uses an over-the-counter antihistamine for sleep but experiences drowsiness the following day. Mirtazapine will be prescribed to be taken an hour before sleep, which may also help with her appetite. She is advised to aim for 6 to 8 hours of sleep over a 24-hour period and to discuss sleep hygiene with her therapist.    Follow-up  The patient will follow up in 4 weeks.       Labs Reviewed : labs reviewed  EKG Reviewed : sinus bradycardia  UDS Reviewed : UDS reviewed and positive for marijuana  Chart since last visit reviewed : chart reviewed extensively    Results  Testing  EKG shows bradycardia sinus.       Functional Status: No impairment    Prognosis: Good with Ongoing Treatment     Impression/Formulation:  Patient appeared alert and oriented. Patient is receptive to assistance with maintaining a stable lifestyle.  Patient presents with history of     ICD-10-CM ICD-9-CM   1. ADHD (attention deficit hyperactivity disorder), inattentive type  F90.0 314.00   2. Chronic post-traumatic stress disorder (PTSD)  F43.12 309.81   3. Performance anxiety  F41.8 300.09   4. Phobia to darkness or night  F40.228 300.29   5. Sinus bradycardia  R00.1 427.89   6. Insomnia secondary to anxiety  F41.9 300.00    F51.05 327.02   .      Treatment Plan:     Patient will continue supportive efforts and medications as indicated. Clinic will obtain release of information for current treatment team for continuity of care as needed. Patient will contact this office, call 911 or present to the nearest emergency room should suicidal or homicidal ideations occur.  Discussed medication options and treatment plan of prescribed medication(s) as well as the risks, benefits, and potential side effects. Patient ackowledged and verbally consented to continue with current treatment plan and was educated on the importance of compliance with treatment and follow-up appointments.       Quality Measures:  Tobacco: Aileen Still  reports that she has never smoked. She has never used smokeless tobacco. I have educated her on the risk of diseases from using tobacco products such as cancer, COPD, heart disease, and she is a non-smoker .     I spent 1 minute counseling the patient.          Depression (PHQ >11): Patient screened positive for depression based on a PHQ-9 score of 11 on 3/18/2025. Follow-up recommendations include: Suicide Risk Assessment performed and Continue with medication management.       Follow Up:   Return in about 4 weeks (around 4/15/2025) for Recheck.    Short-term goals: Patient will adhere to medication regimen and experience continued improvement in symptoms over the next 3 months.   Long-term goals: Patient will adhere to medication treatment plan and report improvement in symptoms over the next 6 months    Patient or patient representative verbalized consent for the use of Ambient Listening during the visit with  Xavier Finney MD for chart documentation. 3/18/2025  9:03 EDT    Xavier Finney MD  [facility]     This is electronically signed by Xavier Finney MD  03/18/2025 09:03 EDT

## 2025-03-21 ENCOUNTER — PRIOR AUTHORIZATION (OUTPATIENT)
Age: 20
End: 2025-03-21
Payer: COMMERCIAL

## 2025-03-21 LAB
QT INTERVAL: 396 MS
QTC INTERVAL: 385 MS

## 2025-03-24 NOTE — TELEPHONE ENCOUNTER
Faxed to plan. Contact plan to follow up on [id number].   Ketoconazole Counseling:   Patient counseled regarding improving absorption with orange juice.  Adverse effects include but are not limited to breast enlargement, headache, diarrhea, nausea, upset stomach, liver function test abnormalities, taste disturbance, and stomach pain.  There is a rare possibility of liver failure that can occur when taking ketoconazole. The patient understands that monitoring of LFTs may be required, especially at baseline. The patient verbalized understanding of the proper use and possible adverse effects of ketoconazole.  All of the patient's questions and concerns were addressed.

## 2025-03-24 NOTE — TELEPHONE ENCOUNTER
Patient called to check on status of prescription for methylphenidate. I advised that we are finishing submitting the prior authorization, and that approval time will depend on patient's pharmacy . Advised that approval on their end can take anywhere from one hour to 72 hours. Patient verbalized understanding. Patient would like a call back when we receive a response, as she was waiting on picking up the mirtazapine until both prescriptions were ready.

## 2025-03-26 NOTE — TELEPHONE ENCOUNTER
Patient called to check status of prior authorization. I advised that we are looking out for a fax from her pharmacy , and that we would give her a call as soon as we received something. Patient verbalized understanding and was appreciative.

## 2025-03-31 NOTE — TELEPHONE ENCOUNTER
Nandini from [facility] called back and confirmed that PA for methylphenidate has gone through. Nandini reported an issue with a new NDC number for the medication.

## 2025-03-31 NOTE — TELEPHONE ENCOUNTER
Called and spoke to patient to update her on the process. Patient confirms that she had a Medicaid plan about two years ago, but it has not been active since she started on [employer] insurance. Advised patient that we would call her back as soon as we get some sort of update. Patient verbalized understanding and was appreciative.

## 2025-03-31 NOTE — TELEPHONE ENCOUNTER
Called and spoke to patient's pharmacy . Representative advised that PA was dismissed, but it was unclear why. I confirmed that a Medicaid plan had been listed as a secondary insurance at some point, but that the patient did not use this for her last visit. Representative was unsure what exactly what was going on but stated they would ask around and call back.

## 2025-04-27 DIAGNOSIS — F51.05 INSOMNIA SECONDARY TO ANXIETY: ICD-10-CM

## 2025-04-27 DIAGNOSIS — F41.9 INSOMNIA SECONDARY TO ANXIETY: ICD-10-CM

## 2025-04-28 RX ORDER — MIRTAZAPINE 7.5 MG/1
7.5 TABLET, FILM COATED ORAL NIGHTLY
Qty: 30 TABLET | Refills: 0 | OUTPATIENT
Start: 2025-04-28

## 2025-05-05 ENCOUNTER — HOSPITAL ENCOUNTER (EMERGENCY)
Facility: HOSPITAL | Age: 20
Discharge: HOME | End: 2025-05-06
Attending: EMERGENCY MEDICINE | Admitting: EMERGENCY MEDICINE
Payer: COMMERCIAL

## 2025-05-05 DIAGNOSIS — T78.40XA ALLERGIC REACTION, INITIAL ENCOUNTER: Primary | ICD-10-CM

## 2025-05-05 LAB
HOLD SPECIMEN: NORMAL
WHOLE BLOOD HOLD COAG: NORMAL
WHOLE BLOOD HOLD SPECIMEN: NORMAL

## 2025-05-05 PROCEDURE — 63710000001 PREDNISONE PER 1 MG: Performed by: EMERGENCY MEDICINE

## 2025-05-05 PROCEDURE — 99283 EMERGENCY DEPT VISIT LOW MDM: CPT

## 2025-05-05 PROCEDURE — 63710000001 DIPHENHYDRAMINE PER 50 MG: Performed by: EMERGENCY MEDICINE

## 2025-05-05 RX ORDER — PREDNISONE 20 MG/1
60 TABLET ORAL ONCE
Status: COMPLETED | OUTPATIENT
Start: 2025-05-05 | End: 2025-05-05

## 2025-05-05 RX ORDER — SODIUM CHLORIDE 0.9 % (FLUSH) 0.9 %
10 SYRINGE (ML) INJECTION AS NEEDED
Status: DISCONTINUED | OUTPATIENT
Start: 2025-05-05 | End: 2025-05-06 | Stop reason: HOSPADM

## 2025-05-05 RX ORDER — FAMOTIDINE 20 MG/1
20 TABLET, FILM COATED ORAL ONCE
Status: COMPLETED | OUTPATIENT
Start: 2025-05-05 | End: 2025-05-05

## 2025-05-05 RX ORDER — DIPHENHYDRAMINE HCL 50 MG
50 CAPSULE ORAL ONCE
Status: COMPLETED | OUTPATIENT
Start: 2025-05-05 | End: 2025-05-05

## 2025-05-05 RX ADMIN — DIPHENHYDRAMINE HYDROCHLORIDE 50 MG: 50 CAPSULE ORAL at 23:44

## 2025-05-05 RX ADMIN — FAMOTIDINE 20 MG: 20 TABLET, FILM COATED ORAL at 23:44

## 2025-05-05 RX ADMIN — PREDNISONE 60 MG: 20 TABLET ORAL at 23:44

## 2025-05-05 NOTE — Clinical Note
[facility] EMERGENCY DEPARTMENT  [address]  [facility] [address]  Phone: [phone]    Aileen Still was seen and treated in our emergency department on 5/5/2025.  She may return to work on 05/08/2025.         Thank you for choosing [facility].    Jillian Salazar MD

## 2025-05-06 VITALS
HEIGHT: 64 IN | WEIGHT: 127 LBS | OXYGEN SATURATION: 98 % | HEART RATE: 102 BPM | SYSTOLIC BLOOD PRESSURE: 135 MMHG | DIASTOLIC BLOOD PRESSURE: 54 MMHG | TEMPERATURE: 98.7 F | RESPIRATION RATE: 16 BRPM | BODY MASS INDEX: 21.68 KG/M2

## 2025-05-06 RX ORDER — FAMOTIDINE 20 MG/1
20 TABLET, FILM COATED ORAL 2 TIMES DAILY
Qty: 10 TABLET | Refills: 0 | Status: SHIPPED | OUTPATIENT
Start: 2025-05-05 | End: 2025-05-10

## 2025-05-06 RX ORDER — PREDNISONE 20 MG/1
20 TABLET ORAL 3 TIMES DAILY
Qty: 15 TABLET | Refills: 0 | Status: SHIPPED | OUTPATIENT
Start: 2025-05-05

## 2025-05-06 NOTE — ED PROVIDER NOTES
Subjective   History of Present Illness  40-year-old female presents for evaluation of allergic reaction.  The patient has a history of significant seasonal allergies and food related allergies.  She reports that she was eating a taco salad from the cafeteria at the hospital when she began to have tingling around her lips.  She initially had put Chapstick, because she thought her lips were just dry.  However she did began to develop a sensation of swelling in the back of her throat.  She is breathing fine upon initial presentation.  She is able to swallow her secretions without difficulty.  She denies shortness of breath.  No chest pain or abdominal pain.  No nausea or vomiting.  No change in bowel or urinary function.  No other acute complaints      Review of Systems   Constitutional:  Negative for chills, fatigue and fever.   HENT:  Positive for facial swelling and sore throat. Negative for congestion, ear pain, postnasal drip and sinus pressure.    Eyes:  Negative for pain, redness and visual disturbance.   Respiratory:  Negative for cough, chest tightness and shortness of breath.    Cardiovascular:  Negative for chest pain, palpitations and leg swelling.   Gastrointestinal:  Negative for abdominal pain, anal bleeding, blood in stool, diarrhea, nausea and vomiting.   Endocrine: Negative for polydipsia and polyuria.   Genitourinary:  Negative for difficulty urinating, dysuria, frequency and urgency.   Musculoskeletal:  Negative for arthralgias, back pain and neck pain.   Skin:  Negative for pallor and rash.   Allergic/Immunologic: Negative for environmental allergies and immunocompromised state.   Neurological:  Negative for dizziness, weakness and headaches.   Hematological:  Negative for adenopathy.   Psychiatric/Behavioral:  Negative for confusion, self-injury and suicidal ideas. The patient is not nervous/anxious.    All other systems reviewed and are negative.      Past Medical History:   Diagnosis Date     Asthma     Chlamydia        No Known Allergies    No past surgical history on file.    Family History   Problem Relation Age of Onset    Breast cancer Maternal Grandmother     Breast cancer Paternal Grandmother        Social History     Socioeconomic History    Marital status: Single   Tobacco Use    Smoking status: Never    Smokeless tobacco: Never   Vaping Use    Vaping status: Never Used   Substance and Sexual Activity    Alcohol use: Never    Drug use: Yes     Types: Marijuana    Sexual activity: Yes     Partners: Male           Objective   Physical Exam  Vitals and nursing note reviewed.   Constitutional:       General: She is not in acute distress.     Appearance: Normal appearance. She is well-developed. She is not toxic-appearing or diaphoretic.   HENT:      Head: Normocephalic and atraumatic.      Right Ear: External ear normal.      Left Ear: External ear normal.      Nose: Nose normal.      Mouth/Throat:      Mouth: Angioedema present.      Comments: Erythema to the posterior oropharynx and soft palate.  Midline uvula.  No tonsillar swelling.  Posterior oropharynx is patent.  Eyes:      General: Lids are normal.      Pupils: Pupils are equal, round, and reactive to light.   Neck:      Trachea: No tracheal deviation.   Cardiovascular:      Rate and Rhythm: Normal rate and regular rhythm.      Pulses: No decreased pulses.      Heart sounds: Normal heart sounds. No murmur heard.     No friction rub. No gallop.   Pulmonary:      Effort: Pulmonary effort is normal. No respiratory distress.      Breath sounds: Normal breath sounds. No decreased breath sounds, wheezing, rhonchi or rales.   Abdominal:      General: Bowel sounds are normal.      Palpations: Abdomen is soft.      Tenderness: There is no abdominal tenderness. There is no guarding or rebound.   Musculoskeletal:         General: No deformity. Normal range of motion.      Cervical back: Normal range of motion and neck supple.   Lymphadenopathy:       Cervical: No cervical adenopathy.   Skin:     General: Skin is warm and dry.      Findings: No rash.   Neurological:      Mental Status: She is alert and oriented to person, place, and time.      Cranial Nerves: No cranial nerve deficit.      Sensory: No sensory deficit.   Psychiatric:         Mood and Affect: Mood is anxious.         Speech: Speech normal.         Behavior: Behavior normal.         Thought Content: Thought content normal.         Judgment: Judgment normal.         Procedures           ED Course                                                       Medical Decision Making  Adverse allergic reaction, angioedema, other unspecified etiology.    The patient's posterior oropharynx is erythematous, but patent.    The patient was observed and symptoms continue to improve.  The patient was given Benadryl, prednisone, and Pepcid.    She will be prescription for steroids, Pepcid.  Advised to take over-the-counter benadryl as needed.     Risk  Prescription drug management.        Final diagnoses:   Allergic reaction, initial encounter       ED Disposition  ED Disposition       ED Disposition   Discharge    Condition   Stable    Comment   --               Eagle Royal, APRN  [address] [place] [address]  [phone]    In 1 week           Medication List        New Prescriptions      famotidine 20 MG tablet  Commonly known as: PEPCID  Take 1 tablet by mouth 2 (Two) Times a Day for 5 days.     predniSONE 20 MG tablet  Commonly known as: DELTASONE  Take 1 tablet by mouth 3 (Three) Times a Day.               Where to Get Your Medications        These medications were sent to [facility]/pharmacy #[id number] - [place], [place] - [address] [place] [address] - [phone]  - [phone] [address] [address]      Phone: [phone]   famotidine 20 MG tablet  predniSONE 20 MG tablet            Jillian Salazar MD  05/06/25 0000

## 2025-05-06 NOTE — DISCHARGE INSTRUCTIONS
Take prednisone, Benadryl, and Pepcid as prescribed.    Observe symptoms and return to the ER as needed

## 2025-05-07 ENCOUNTER — OFFICE VISIT (OUTPATIENT)
Dept: OBSTETRICS AND GYNECOLOGY | Facility: CLINIC | Age: 20
End: 2025-05-07
Payer: COMMERCIAL

## 2025-05-07 VITALS
WEIGHT: 132 LBS | SYSTOLIC BLOOD PRESSURE: 122 MMHG | BODY MASS INDEX: 22.53 KG/M2 | HEIGHT: 64 IN | DIASTOLIC BLOOD PRESSURE: 62 MMHG

## 2025-05-07 DIAGNOSIS — Z12.4 SCREENING FOR CERVICAL CANCER: ICD-10-CM

## 2025-05-07 DIAGNOSIS — Z01.419 WOMEN'S ANNUAL ROUTINE GYNECOLOGICAL EXAMINATION: Primary | ICD-10-CM

## 2025-05-07 DIAGNOSIS — Z30.45 ENCOUNTER FOR SURVEILLANCE OF TRANSDERMAL PATCH HORMONAL CONTRACEPTIVE DEVICE: ICD-10-CM

## 2025-05-07 RX ORDER — LEVONORGESTREL/ETHINYL ESTRADIOL 2.6; 2.3 MG/1; MG/1
1 PATCH TRANSDERMAL WEEKLY
Qty: 3 PATCH | Refills: 12 | Status: SHIPPED | OUTPATIENT
Start: 2025-05-07

## 2025-05-07 NOTE — PROGRESS NOTES
Subjective   Chief Complaint   Patient presents with    Gynecologic Exam     First pap, discuss birth control options       Aileen Still is a 20 y.o. year old  presenting to be seen for her annual gynecological exam.   She is desiring contraception. Had previously tried Xulane patch for 1 month early  but felt emotional on the patch  Does not feel she can take ocp consistently  After discussing all options for contraception she would like to try a different patch  ..Patient's last menstrual period was 04/15/2025 (exact date).     Past Medical History:   Diagnosis Date    Asthma     Chlamydia         Current Outpatient Medications:     famotidine (PEPCID) 20 MG tablet, Take 1 tablet by mouth 2 (Two) Times a Day for 5 days., Disp: 10 tablet, Rfl: 0    predniSONE (DELTASONE) 20 MG tablet, Take 1 tablet by mouth 3 (Three) Times a Day., Disp: 15 tablet, Rfl: 0    Levonorgestrel-Eth Estradiol (Twirla) 120-30 MCG/24HR patch weekly, Place 1 patch on the skin as directed by provider 1 (One) Time Per Week. Apply a new patch to skin q 7 days for 3 weeks in a row followed by a patch free week. Then start new patch and repeat, Disp: 3 patch, Rfl: 12    methylphenidate LA (Ritalin LA) 10 MG 24 hr capsule, Take 1 capsule by mouth Daily for 30 days, Disp: 30 capsule, Rfl: 0    mirtazapine (REMERON) 7.5 MG tablet, Take 1 tablet by mouth Every Night for 30 days., Disp: 30 tablet, Rfl: 0   No Known Allergies   History reviewed. No pertinent surgical history.   Social History     Socioeconomic History    Marital status: Single   Tobacco Use    Smoking status: Never    Smokeless tobacco: Never   Vaping Use    Vaping status: Never Used   Substance and Sexual Activity    Alcohol use: Never    Drug use: Yes     Types: Marijuana    Sexual activity: Yes     Partners: Male     Birth control/protection: Patch      Family History   Problem Relation Age of Onset    Breast cancer Maternal Grandmother     Breast cancer  "Paternal Grandmother        Review of Systems   Constitutional: Negative.    Gastrointestinal: Negative.    Genitourinary:  Positive for vaginal discharge. Negative for dysuria and pelvic pain.           Objective   /62   Ht 162.6 cm (64\")   Wt 59.9 kg (132 lb)   LMP 04/15/2025 (Exact Date)   BMI 22.66 kg/m²     Physical Exam  Exam conducted with a chaperone present.   Constitutional:       Appearance: Normal appearance. She is well-developed and well-groomed.   Eyes:      General: Lids are normal.      Extraocular Movements: Extraocular movements intact.      Conjunctiva/sclera: Conjunctivae normal.   Chest:   Breasts:     Breasts are symmetrical.      Right: No inverted nipple, mass, nipple discharge, skin change or tenderness.      Left: No inverted nipple, mass, nipple discharge, skin change or tenderness.   Abdominal:      General: There is no distension.      Palpations: Abdomen is soft.      Tenderness: There is no abdominal tenderness.   Genitourinary:     Exam position: Lithotomy position.      Labia:         Right: No rash, tenderness or lesion.         Left: No rash, tenderness or lesion.       Urethra: No prolapse, urethral pain, urethral swelling or urethral lesion.      Vagina: No vaginal discharge, tenderness or lesions.      Cervix: No cervical motion tenderness, discharge, friability or lesion.      Uterus: Not enlarged and not tender.       Adnexa:         Right: No mass or tenderness.          Left: No mass or tenderness.        Comments: Creamy white discharge  Musculoskeletal:      Cervical back: Neck supple.   Lymphadenopathy:      Upper Body:      Right upper body: No axillary adenopathy.      Left upper body: No axillary adenopathy.   Skin:     General: Skin is warm and dry.      Findings: No lesion.   Neurological:      General: No focal deficit present.      Mental Status: She is alert and oriented to person, place, and time.   Psychiatric:         Attention and Perception: " Attention normal.         Mood and Affect: Mood normal.         Speech: Speech normal.         Behavior: Behavior normal.         Thought Content: Thought content normal.            Result Review :                   Assessment and Plan  Diagnoses and all orders for this visit:    1. Women's annual routine gynecological examination (Primary)    2. Screening for cervical cancer  -     LIQUID-BASED PAP SMEAR WITH HPV GENOTYPING IF ASCUS ([facility])    3. Encounter for surveillance of transdermal patch hormonal contraceptive device    Other orders  -     Levonorgestrel-Eth Estradiol (Twirla) 120-30 MCG/24HR patch weekly; Place 1 patch on the skin as directed by provider 1 (One) Time Per Week. Apply a new patch to skin q 7 days for 3 weeks in a row followed by a patch free week. Then start new patch and repeat  Dispense: 3 patch; Refill: 12      Patient Instructions   Self breast exam monthly  Regular exercise    Condoms always           This note was electronically signed.    Joselin Alfonso PA-C   May 7, 2025

## 2025-05-09 ENCOUNTER — OFFICE VISIT (OUTPATIENT)
Age: 20
End: 2025-05-09
Payer: COMMERCIAL

## 2025-05-09 VITALS
OXYGEN SATURATION: 97 % | HEIGHT: 64 IN | WEIGHT: 129.3 LBS | BODY MASS INDEX: 22.07 KG/M2 | SYSTOLIC BLOOD PRESSURE: 106 MMHG | HEART RATE: 78 BPM | DIASTOLIC BLOOD PRESSURE: 68 MMHG

## 2025-05-09 DIAGNOSIS — F41.8 PERFORMANCE ANXIETY: ICD-10-CM

## 2025-05-09 DIAGNOSIS — F51.05 INSOMNIA SECONDARY TO ANXIETY: ICD-10-CM

## 2025-05-09 DIAGNOSIS — F90.0 ADHD (ATTENTION DEFICIT HYPERACTIVITY DISORDER), INATTENTIVE TYPE: Primary | ICD-10-CM

## 2025-05-09 DIAGNOSIS — F41.9 INSOMNIA SECONDARY TO ANXIETY: ICD-10-CM

## 2025-05-09 DIAGNOSIS — F43.12 CHRONIC POST-TRAUMATIC STRESS DISORDER (PTSD): ICD-10-CM

## 2025-05-09 DIAGNOSIS — F40.228: ICD-10-CM

## 2025-05-09 RX ORDER — METHYLPHENIDATE HYDROCHLORIDE 10 MG/1
10 CAPSULE, EXTENDED RELEASE ORAL DAILY
Qty: 30 CAPSULE | Refills: 0 | Status: SHIPPED | OUTPATIENT
Start: 2025-05-09 | End: 2025-06-08

## 2025-05-09 RX ORDER — TRAZODONE HYDROCHLORIDE 50 MG/1
25 TABLET ORAL NIGHTLY
Qty: 15 TABLET | Refills: 0 | Status: SHIPPED | OUTPATIENT
Start: 2025-05-09 | End: 2025-06-08

## 2025-05-09 NOTE — PROGRESS NOTES
"      [facility]    Follow Up Office Visit      Date: 2025   Patient Name: Aileen Still  : 2005   MRN: [id number]     Referring Provider: Eagle Royal APRN    Chaperone Statement: Rosa TURCIOSHari served as a chaperone for this visit and was present for the full visit from 14:00 to 14:15. Patient agreeable to chaperone presence during appointment.     Chief Complaint:      ICD-10-CM ICD-9-CM   1. ADHD (attention deficit hyperactivity disorder), inattentive type  F90.0 314.00   2. Chronic post-traumatic stress disorder (PTSD)  F43.12 309.81   3. Performance anxiety  F41.8 300.09   4. Phobia to darkness or night  F40.228 300.29   5. Insomnia secondary to anxiety  F41.9 300.00    F51.05 327.02        History of Present Illness:   Aileen Still is a 20 y.o. female who is here for follow up with medication management.    History of Present Illness  The patient is a 20-year-old female who presents for evaluation of anxiety, sleep issues, and bradycardia.    She has been experiencing anxiety, which she attributes to academic stress. She reports episodes of crying due to anxiety approximately twice a week, a symptom that has been present since her last visit. She does not currently engage in therapy. She has not previously tried buspirone.    She did not attend the scheduled therapy session in [place] due to apprehension about potential acquaintances. She resides in [place] and is open to both male and female therapists. She has discontinued mirtazapine due to concerns about its impact on her behavior. Her appetite has improved, and she has been incorporating snacks into her daily routine. She reports no current depression or panic attacks. She occasionally experiences feelings of hopelessness, rating them at \"5 out of 10,\" and loneliness, rating them at \"7 out of 10.\" She reports no suicidal ideation, impulse control issues, or memory problems. She believes " her attention and concentration have improved with Ritalin, although she occasionally struggles with multitasking. She reports no appetite suppression with Ritalin, but does experience burping. She reports no headaches or palpitations.    She reports poor sleep quality, although it is unclear if this is a side effect of Ritalin as her sleep was already compromised prior to its initiation. She typically sleeps for six hours per night, often taking up to two hours to fall asleep. She rarely wakes up during the night, and when she does, it is usually to urinate, after which she can return to sleep. She does not report early morning awakenings. She occasionally goes to bed late and wakes up late, but still reports poor sleep quality. She is considering switching from a night shift to a day shift. Her mood today is fair.    An EKG was performed, which showed bradycardia. She tries to run at least 3 times a week.    Social History:  - Resides in [place]  - Academic stress due to school  - Works night shifts, considering switching to day shifts    Pertinent Negatives:  - No suicidal ideation  - No impulse control issues  - No memory problems  - No panic attacks  - No headaches or palpitations        Subjective     Screening Scores:   PHQ-9 : 9  DEREJE-7 : 12    Medications:     Current Outpatient Medications:     famotidine (PEPCID) 20 MG tablet, Take 1 tablet by mouth 2 (Two) Times a Day for 5 days., Disp: 10 tablet, Rfl: 0    Levonorgestrel-Eth Estradiol (Twirla) 120-30 MCG/24HR patch weekly, Place 1 patch on the skin as directed by provider 1 (One) Time Per Week. Apply a new patch to skin q 7 days for 3 weeks in a row followed by a patch free week. Then start new patch and repeat, Disp: 3 patch, Rfl: 12    methylphenidate LA (Ritalin LA) 10 MG 24 hr capsule, Take 1 capsule by mouth Daily for 30 days, Disp: 30 capsule, Rfl: 0    predniSONE (DELTASONE) 20 MG tablet, Take 1 tablet by mouth 3 (Three) Times a Day. (Patient not  "taking: Reported on 5/9/2025), Disp: 15 tablet, Rfl: 0    traZODone (DESYREL) 50 MG tablet, Take 0.5 tablets by mouth Every Night for 30 days., Disp: 15 tablet, Rfl: 0    Medication Considerations:  DAVID reviewed and appropriate.     Allergies:   No Known Allergies    Objective     Vital Signs:   Vitals:    05/09/25 1349   BP: 106/68   Pulse: 78   SpO2: 97%   Weight: 58.7 kg (129 lb 4.8 oz)   Height: 162.6 cm (64.02\")     Body mass index is 22.18 kg/m².     Mental Status Exam:   MENTAL STATUS EXAM   General Appearance:  Cleanly groomed and dressed  Eye Contact:  Good eye contact  Attitude:  Cooperative  Motor Activity:  Normal gait, posture  Muscle Strength:  Normal  Speech:  Normal rate, tone, volume  Language:  Spontaneous  Mood and affect:  Euthymic  Hopelessness:  5  Loneliness: 7  Thought Process:  Logical, goal-directed and linear  Associations/ Thought Content:  No delusions  Hallucinations:  None  Suicidal Ideations:  Not present  Homicidal Ideation:  Not present  Sensorium:  Alert and clear  Orientation:  Person, place, time and situation  Immediate Recall, Recent, and Remote Memory:  Intact  Attention Span/ Concentration:  Good  Fund of Knowledge:  Appropriate for age and educational level  Intellectual Functioning:  Average range  Insight:  Good  Judgement:  Good  Reliability:  Good  Impulse Control:  Fair        SUICIDE RISK ASSESSMENT/CSSRS:  1. Does patient have thoughts of suicide? She denies  2. Does patient have intent for suicide? She denies  3. Does patient have a current plan for suicide? She denies  4. History of suicide attempts: she denies  5. Family history of suicide or attempts: she denies  6. History of violent behaviors towards others or property or thoughts of committing suicide: she denies  7. History of sexual aggression toward others: she denies  8. Access to firearms or weapons: she denies    Assessment / Plan      Visit Diagnosis/Orders Placed This Visit:  Diagnoses and all orders " for this visit:    1. ADHD (attention deficit hyperactivity disorder), inattentive type (Primary)  -     methylphenidate LA (Ritalin LA) 10 MG 24 hr capsule; Take 1 capsule by mouth Daily for 30 days  Dispense: 30 capsule; Refill: 0    2. Chronic post-traumatic stress disorder (PTSD)  -     Ambulatory Referral to Behavioral Health    3. Performance anxiety    4. 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 Insomnia secondary to anxiety  -     traZODone (DESYREL) 50 MG tablet; Take 0.5 tablets by mouth Every Night for 30 days.  Dispense: 15 tablet; Refill: 0         Assessment & Plan  Problems:  - Generalized anxiety disorder.  - Sleep disturbance.  - Medication management.    Content of Therapy:  During the session, the patient discussed her anxiety about taking mirtazapine, concerns about sleep quality, and the impact of her night shift work on her sleep. She expressed feelings of loneliness and occasional hopelessness. The patient also reported improvements in appetite and concentration with Ritalin but noted persistent sleep issues. The discussion included reframing thoughts about medication side effects and exploring feelings related to anxiety and sleep disturbances.    Clinical Impression:  The patient presents with significant anxiety, particularly related to medication changes and sleep disturbances. She reports occasional feelings of hopelessness and loneliness, rated at 5/10 and 7/10, respectively. Despite these challenges, she has seen improvements in appetite and concentration with Ritalin. Her sleep remains disrupted, likely exacerbated by her night shift work. Bradycardia noted on EKG is consistent with her athletic lifestyle.    Therapeutic Intervention:  Cognitive-behavioral strategies were employed to address anxiety related to medication changes. Psychoeducation was provided regarding the effects of trazodone and its potential benefits for sleep. Mindfulness exercises were recommended to help manage anxiety  and improve sleep quality.    Plan:  - Prescribe trazodone 25 mg to be taken an hour before bedtime. Reduce dosage by half if morning grogginess occurs.  - Discontinue mirtazapine due to patient concerns about side effects.  - Continue Ritalin at the current dosage, especially during 12-hour shifts.  - Referral for therapy in [place] to address her chronic PTSD.  - Encourage self-care practices, including regular exercise and mindfulness exercises.  - Homework: Practice mindfulness exercises daily and monitor sleep patterns.    Follow-up:  - Follow-up appointment in 4 weeks to assess the effectiveness of trazodone and overall mental health progress.  - Goals for the next session: Evaluate sleep quality, anxiety levels, and response to trazodone.    Notes & Risk Factors:  - No suicidal thoughts reported.  - Protective factors include regular exercise and proactive engagement in self-care.       Labs Reviewed : labs reviewed  Chart since last visit reviewed : chart reviewed    Results         Functional Status: No impairment    Prognosis: Good with Ongoing Treatment     Impression/Formulation:  Patient appeared alert and oriented. Patient is receptive to assistance with maintaining a stable lifestyle.  Patient presents with history of     ICD-10-CM ICD-9-CM   1. ADHD (attention deficit hyperactivity disorder), inattentive type  F90.0 314.00   2. Chronic post-traumatic stress disorder (PTSD)  F43.12 309.81   3. Performance anxiety  F41.8 300.09   4. Phobia to darkness or night  F40.228 300.29   5. Insomnia secondary to anxiety  F41.9 300.00    F51.05 327.02   .     Treatment Plan:     Patient will continue supportive efforts and medications as indicated. Clinic will obtain release of information for current treatment team for continuity of care as needed. Patient will contact this office, call 911 or present to the nearest emergency room should suicidal or homicidal ideations occur.  Discussed medication options and  treatment plan of prescribed medication(s) as well as the risks, benefits, and potential side effects. Patient acknowledged and verbally consented to continue with current treatment plan and was educated on the importance of compliance with treatment and follow-up appointments.       Quality Measures:  Tobacco: Aileen Still  reports that she has never smoked. She has never used smokeless tobacco. I have educated her on the risk of diseases from using tobacco products such as cancer, COPD, heart disease, and she is a non-smoker .   I spent  1  minute counseling the patient.          Depression (PHQ >11): Patient screened negative for depression based on a PHQ-9 score of 9 on 5/9/2025. Follow-up recommendations include: Suicide Risk Assessment performed and Continue with medication management.       Follow Up:   Return in about 4 weeks (around 6/6/2025) for Recheck.    Short-term goals: Patient will adhere to medication regimen and experience continued improvement in symptoms over the next 3 months.   Long-term goals: Patient will adhere to medication treatment plan and report improvement in symptoms over the next 6 months    Patient or patient representative verbalized consent for the use of Ambient Listening during the visit with  Xavier Finney MD for chart documentation. 5/9/2025  14:00 EDT    Xavier Finney MD  [facility]     This is electronically signed by Xavier Finney MD  05/09/2025 13:53 EDT

## 2025-05-12 LAB — REF LAB TEST METHOD: NORMAL

## 2025-05-20 DIAGNOSIS — F90.0 ADHD (ATTENTION DEFICIT HYPERACTIVITY DISORDER), INATTENTIVE TYPE: ICD-10-CM

## 2025-05-20 RX ORDER — METHYLPHENIDATE HYDROCHLORIDE 10 MG/1
10 CAPSULE, EXTENDED RELEASE ORAL DAILY
Qty: 30 CAPSULE | Refills: 0 | OUTPATIENT
Start: 2025-05-20 | End: 2025-06-19